# Patient Record
Sex: MALE | Race: WHITE | NOT HISPANIC OR LATINO | Employment: FULL TIME | URBAN - METROPOLITAN AREA
[De-identification: names, ages, dates, MRNs, and addresses within clinical notes are randomized per-mention and may not be internally consistent; named-entity substitution may affect disease eponyms.]

---

## 2020-01-29 ENCOUNTER — PROCEDURE VISIT (OUTPATIENT)
Dept: NEUROLOGY | Facility: CLINIC | Age: 55
End: 2020-01-29

## 2020-01-29 DIAGNOSIS — G56.03 BILATERAL CARPAL TUNNEL SYNDROME: Primary | ICD-10-CM

## 2020-01-29 PROCEDURE — 95910 NRV CNDJ TEST 7-8 STUDIES: CPT | Performed by: PSYCHIATRY & NEUROLOGY

## 2020-01-29 PROCEDURE — 95886 MUSC TEST DONE W/N TEST COMP: CPT | Performed by: PSYCHIATRY & NEUROLOGY

## 2020-01-29 NOTE — PROGRESS NOTES
EMG and Nerve Conduction Studies    The complete report includes the data sheets.     Referring Doctor: Brady Santiago MD    History this patient is a 54-year-old male with complaints of numbness in both hands and history of carpal tunnel syndrome.  He has had cervical spine surgery in the past.    Results:    1.  The right median sensory and motor distal latencies are prolonged.  The forearm conduction velocity is normal.    2.  The left median sensory and motor distal latencies are prolonged.  The forearm conduction velocity is normal.    3.  The ulnar sensory and motor nerve conduction studies are normal.    4.  EMG of the muscles examined in the C5-T1 myotomes were normal bilaterally.    Impression:    This is an abnormal study.  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  There is no evidence of focal ulnar neuropathy at the elbows.  EMG of the muscles examined in the C5-T1 myotomes were normal bilaterally    Joseph Seipel, M.D.